# Patient Record
Sex: MALE | Race: ASIAN | NOT HISPANIC OR LATINO | URBAN - METROPOLITAN AREA
[De-identification: names, ages, dates, MRNs, and addresses within clinical notes are randomized per-mention and may not be internally consistent; named-entity substitution may affect disease eponyms.]

---

## 2020-03-09 ENCOUNTER — EMERGENCY (EMERGENCY)
Facility: HOSPITAL | Age: 45
LOS: 0 days | Discharge: HOME | End: 2020-03-09
Attending: EMERGENCY MEDICINE
Payer: MEDICAID

## 2020-03-09 VITALS
RESPIRATION RATE: 19 BRPM | HEIGHT: 70 IN | DIASTOLIC BLOOD PRESSURE: 88 MMHG | TEMPERATURE: 98 F | WEIGHT: 240.08 LBS | HEART RATE: 108 BPM | OXYGEN SATURATION: 98 % | SYSTOLIC BLOOD PRESSURE: 154 MMHG

## 2020-03-09 VITALS
RESPIRATION RATE: 18 BRPM | SYSTOLIC BLOOD PRESSURE: 145 MMHG | TEMPERATURE: 98 F | HEART RATE: 103 BPM | OXYGEN SATURATION: 98 % | DIASTOLIC BLOOD PRESSURE: 91 MMHG

## 2020-03-09 DIAGNOSIS — Z79.899 OTHER LONG TERM (CURRENT) DRUG THERAPY: ICD-10-CM

## 2020-03-09 DIAGNOSIS — Z79.84 LONG TERM (CURRENT) USE OF ORAL HYPOGLYCEMIC DRUGS: ICD-10-CM

## 2020-03-09 DIAGNOSIS — R05 COUGH: ICD-10-CM

## 2020-03-09 DIAGNOSIS — E11.9 TYPE 2 DIABETES MELLITUS WITHOUT COMPLICATIONS: ICD-10-CM

## 2020-03-09 DIAGNOSIS — J45.901 UNSPECIFIED ASTHMA WITH (ACUTE) EXACERBATION: ICD-10-CM

## 2020-03-09 DIAGNOSIS — I10 ESSENTIAL (PRIMARY) HYPERTENSION: ICD-10-CM

## 2020-03-09 PROCEDURE — 99283 EMERGENCY DEPT VISIT LOW MDM: CPT

## 2020-03-09 RX ORDER — ALBUTEROL 90 UG/1
2 AEROSOL, METERED ORAL
Qty: 1 | Refills: 0
Start: 2020-03-09 | End: 2020-04-07

## 2020-03-09 NOTE — ED PROVIDER NOTE - OBJECTIVE STATEMENT
44 year old male with PMH of asthma, DM II, HTN otherwise presenting to ED due to increased cough and use of albuterol pump for past 2 days. pt states otherwise felt increased wheezing that would improve with pump - this starting after URI symptoms starting a few days ago. No fever/chills no persistent SOB, no chest pain.

## 2020-03-09 NOTE — ED ADULT NURSE NOTE - HOW MANY DRINKS CONTAINING ALCOHOL DO YOU HAVE ON A TYPICAL DAY WHEN YOU ARE DRINKING?
VACCINE ADMINISTRATION RECORD  PARENT / GUARDIAN APPROVAL  Date: 2021  Vaccine administered to: Debora Nassar     : 2019    MRN: [id number]    A copy of the appropriate Centers for Disease Control and Prevention Vaccine Information statement 1 or 2

## 2020-03-09 NOTE — ED PROVIDER NOTE - PATIENT PORTAL LINK FT
You can access the FollowMyHealth Patient Portal offered by [facility] by registering at the following website: [email or web address]. By joining [employer]’s FollowMyHealth portal, you will also be able to view your health information using other applications (apps) compatible with our system.

## 2020-03-09 NOTE — ED ADULT TRIAGE NOTE - CHIEF COMPLAINT QUOTE
PHX of asthma  dry cough x 2 days pt denies chest pain,  fever, chills, recent travel, and sick contacts.

## 2020-03-09 NOTE — ED PROVIDER NOTE - CLINICAL SUMMARY MEDICAL DECISION MAKING FREE TEXT BOX
pt with asthma exacerbation - otherwise used pump 2 hours prior with clear lungs during exam, no sign of SOB, no CP - will dc with new pump as requested by Pt and prednisone.

## 2020-03-09 NOTE — ED ADULT NURSE NOTE - OBJECTIVE STATEMENT
Patient received with complaints of cough and fever for the past 2 days. Patient denies any recent travel or been around other sick individuals.

## 2020-08-02 ENCOUNTER — EMERGENCY (EMERGENCY)
Facility: HOSPITAL | Age: 45
LOS: 1 days | Discharge: HOME | End: 2020-08-02
Payer: MEDICAID

## 2020-08-02 VITALS
OXYGEN SATURATION: 98 % | DIASTOLIC BLOOD PRESSURE: 86 MMHG | TEMPERATURE: 98 F | HEIGHT: 70 IN | RESPIRATION RATE: 18 BRPM | SYSTOLIC BLOOD PRESSURE: 141 MMHG | HEART RATE: 80 BPM | WEIGHT: 229.94 LBS

## 2020-08-02 DIAGNOSIS — K08.89 OTHER SPECIFIED DISORDERS OF TEETH AND SUPPORTING STRUCTURES: ICD-10-CM

## 2020-08-02 DIAGNOSIS — E11.9 TYPE 2 DIABETES MELLITUS WITHOUT COMPLICATIONS: ICD-10-CM

## 2020-08-02 DIAGNOSIS — I10 ESSENTIAL (PRIMARY) HYPERTENSION: ICD-10-CM

## 2020-08-02 PROCEDURE — 99283 EMERGENCY DEPT VISIT LOW MDM: CPT

## 2020-08-02 RX ORDER — IBUPROFEN 200 MG
800 TABLET ORAL ONCE
Refills: 0 | Status: COMPLETED | OUTPATIENT
Start: 2020-08-02 | End: 2020-08-02

## 2020-08-02 RX ORDER — IBUPROFEN 200 MG
1 TABLET ORAL
Qty: 21 | Refills: 0
Start: 2020-08-02 | End: 2020-08-08

## 2020-08-02 RX ORDER — OXYCODONE AND ACETAMINOPHEN 5; 325 MG/1; MG/1
1 TABLET ORAL ONCE
Refills: 0 | Status: DISCONTINUED | OUTPATIENT
Start: 2020-08-02 | End: 2020-08-02

## 2020-08-02 RX ADMIN — Medication 300 MILLIGRAM(S): at 21:46

## 2020-08-02 RX ADMIN — Medication 800 MILLIGRAM(S): at 21:46

## 2020-08-02 RX ADMIN — OXYCODONE AND ACETAMINOPHEN 1 TABLET(S): 5; 325 TABLET ORAL at 21:45

## 2020-08-02 NOTE — ED PROVIDER NOTE - ENMT, MLM
Airway patent, Nasal mucosa clear. Mouth with normal mucosa. Throat has no vesicles, no oropharyngeal exudates and uvula is midline. no trismus. no stridor. + cracked tooth # 17 with surrounding gingival swelling. no abscess or fluctuance. no subinguinal swelling

## 2020-08-02 NOTE — ED PROVIDER NOTE - SKIN, MLM
Skin normal color for race, warm, dry and intact. + mild left sided facial swelling. no erythema or increased warmth. no visible abscess.

## 2020-08-02 NOTE — ED PROVIDER NOTE - NSFOLLOWUPCLINICS_GEN_ALL_ED_FT
[facility]  Dental  [address] [place], [place] [address]  Phone: [phone]  Fax:   Follow Up Time: 1-3 Days

## 2020-08-02 NOTE — ED PROVIDER NOTE - CLINICAL SUMMARY MEDICAL DECISION MAKING FREE TEXT BOX
44 yo male with h/o DM and  HTN presents to the ED c/o left side lower dental pain x 2-3 days. Patient admits to having issue with same tooth in past. Patient has appointment with his dentist tomorrow. Pain constant with radiation to left ear, worse with eating and hot/cold. Patient took motrin this morning and then 3 tabs of tylenol 1 hour ago with minimal relief. Patient also using oral gel - no improvement. Associated with left sided facial swelling, however improved from yesterday. Denies fever, chills, chest pain, sob, abd pain, N/V, neck pain/stiffness, headache, change in voice, excessive drooling. A/P; 44 yo male with h/o DM and  HTN presents to the ED c/o left side lower dental pain x 2-3 days. Patient admits to having issue with same tooth in past. Patient has appointment with his dentist tomorrow. Pain constant with radiation to left ear, worse with eating and hot/cold. Patient took motrin this morning and then 3 tabs of tylenol 1 hour ago with minimal relief. Patient also using oral gel - no improvement. Associated with left sided facial swelling, however improved from yesterday. Denies fever, chills, chest pain, sob, abd pain, N/V, neck pain/stiffness, headache, change in voice, excessive drooling. A/P: + cracked tooth with surround gingival swelling, no abscess, no sublingual swelling. Patient given motrin, percocet and clinda in ED. Patient has appointment with dentist tomorrow. Will dc with motrin and clinda. Patient understands return precautions.

## 2020-08-02 NOTE — ED PROVIDER NOTE - PATIENT PORTAL LINK FT
You can access the FollowMyHealth Patient Portal offered by [facility] by registering at the following website: [email or web address]. By joining [employer]’s FollowMyHealth portal, you will also be able to view your health information using other applications (apps) compatible with our system.

## 2020-08-02 NOTE — ED PROVIDER NOTE - OBJECTIVE STATEMENT
46 yo male with h/o DM and  HTN presents to the ED c/o left side lower dental pain x 2-3 days. Patient admits to having issue with same tooth in past. Patient has appointment with his dentist tomorrow. Pain constant with radiation to left ear, worse with eating and hot/cold. Patient took motrin this morning and then 3 tabs of tylenol 1 hour ago with minimal relief. Patient also using oral gel - no improvement. Associated with left sided facial swelling, however improved from yesterday. Denies fever, chills, chest pain, sob, abd pain, N/V, neck pain/stiffness, headache, change in voice, excessive drooling.

## 2020-08-03 PROBLEM — J45.909 UNSPECIFIED ASTHMA, UNCOMPLICATED: Chronic | Status: ACTIVE | Noted: 2020-03-09

## 2020-08-03 PROBLEM — E11.9 TYPE 2 DIABETES MELLITUS WITHOUT COMPLICATIONS: Chronic | Status: ACTIVE | Noted: 2020-03-09

## 2020-08-03 PROBLEM — I10 ESSENTIAL (PRIMARY) HYPERTENSION: Chronic | Status: ACTIVE | Noted: 2020-03-09

## 2021-12-14 ENCOUNTER — EMERGENCY (EMERGENCY)
Facility: HOSPITAL | Age: 46
LOS: 0 days | Discharge: HOME | End: 2021-12-14
Attending: EMERGENCY MEDICINE
Payer: MEDICAID

## 2021-12-14 VITALS
SYSTOLIC BLOOD PRESSURE: 162 MMHG | DIASTOLIC BLOOD PRESSURE: 88 MMHG | TEMPERATURE: 98 F | OXYGEN SATURATION: 97 % | RESPIRATION RATE: 18 BRPM | HEART RATE: 99 BPM

## 2021-12-14 VITALS
RESPIRATION RATE: 20 BRPM | WEIGHT: 240.08 LBS | TEMPERATURE: 98 F | HEIGHT: 70 IN | DIASTOLIC BLOOD PRESSURE: 90 MMHG | SYSTOLIC BLOOD PRESSURE: 154 MMHG | OXYGEN SATURATION: 96 % | HEART RATE: 99 BPM

## 2021-12-14 DIAGNOSIS — R06.2 WHEEZING: ICD-10-CM

## 2021-12-14 DIAGNOSIS — I10 ESSENTIAL (PRIMARY) HYPERTENSION: ICD-10-CM

## 2021-12-14 DIAGNOSIS — J45.901 UNSPECIFIED ASTHMA WITH (ACUTE) EXACERBATION: ICD-10-CM

## 2021-12-14 DIAGNOSIS — Z88.0 ALLERGY STATUS TO PENICILLIN: ICD-10-CM

## 2021-12-14 DIAGNOSIS — Z79.84 LONG TERM (CURRENT) USE OF ORAL HYPOGLYCEMIC DRUGS: ICD-10-CM

## 2021-12-14 DIAGNOSIS — E11.9 TYPE 2 DIABETES MELLITUS WITHOUT COMPLICATIONS: ICD-10-CM

## 2021-12-14 DIAGNOSIS — Z20.822 CONTACT WITH AND (SUSPECTED) EXPOSURE TO COVID-19: ICD-10-CM

## 2021-12-14 PROCEDURE — 99284 EMERGENCY DEPT VISIT MOD MDM: CPT

## 2021-12-14 RX ORDER — ALBUTEROL 90 UG/1
2 AEROSOL, METERED ORAL ONCE
Refills: 0 | Status: COMPLETED | OUTPATIENT
Start: 2021-12-14 | End: 2021-12-14

## 2021-12-14 RX ORDER — GLYBURIDE 5 MG
0 TABLET ORAL
Qty: 0 | Refills: 0 | DISCHARGE

## 2021-12-14 RX ORDER — ALBUTEROL 90 UG/1
2 AEROSOL, METERED ORAL
Qty: 1 | Refills: 0
Start: 2021-12-14 | End: 2022-01-12

## 2021-12-14 RX ORDER — IPRATROPIUM/ALBUTEROL SULFATE 18-103MCG
3 AEROSOL WITH ADAPTER (GRAM) INHALATION ONCE
Refills: 0 | Status: COMPLETED | OUTPATIENT
Start: 2021-12-14 | End: 2021-12-14

## 2021-12-14 RX ORDER — GEMFIBROZIL 600 MG
1 TABLET ORAL
Qty: 0 | Refills: 0 | DISCHARGE

## 2021-12-14 RX ADMIN — Medication 3 MILLILITER(S): at 05:04

## 2021-12-14 RX ADMIN — Medication 50 MILLIGRAM(S): at 04:40

## 2021-12-14 RX ADMIN — Medication 3 MILLILITER(S): at 05:29

## 2021-12-14 RX ADMIN — ALBUTEROL 2 PUFF(S): 90 AEROSOL, METERED ORAL at 05:31

## 2021-12-14 NOTE — ED PROVIDER NOTE - PATIENT PORTAL LINK FT
You can access the FollowMyHealth Patient Portal offered by [facility] by registering at the following website: [email or web address]. By joining [employer]’s FollowMyHealth portal, you will also be able to view your health information using other applications (apps) compatible with our system.

## 2021-12-14 NOTE — ED ADULT NURSE NOTE - CHPI ED NUR SYMPTOMS NEG
no body aches/no chest pain/no chills/no diaphoresis/no edema/no fever/no headache/no hemoptysis/no shortness of breath

## 2021-12-14 NOTE — ED ADULT TRIAGE NOTE - CHIEF COMPLAINT QUOTE
c/o difficulty breathing with some chest tightness, with some occasional non-productive cough:  took albuterol with some effect but ran out of meds. pt able to complete sentences in triage without sob noted. pt with hx of asthma, htn, dm

## 2021-12-14 NOTE — ED ADULT NURSE NOTE - OBJECTIVE STATEMENT
Pt is a 47 yo M, AOx4 ambulatory pmhx of asthma, htn, dm p/w difficulty breathing. Pt states he has some wheezing and occasional unproductive dry cough beginning toady at approx 1700. Pt states he uses HFA pump at home but has run out. Bilateral wheezing noted upon assessment. Pt speaking in full sentences, unlabored breathing. NKDA.

## 2021-12-14 NOTE — ED PROVIDER NOTE - OBJECTIVE STATEMENT
Pertinent PMH/PSH/FHx/SHx and Review of Systems contained within:  Patient presents to the ED for asthma exacerbation.  Patient well appearing reports wheezing since yesterday which he attributes to cold weather.  Patient says that he ran out of his ventolin pump at home.  Never intubated.  Denies fever, change in sputum, chest pain, sore throat, URI sx.  Patient denies EtOH/tobacco/illicit substance use.    ROS: No fever/chills, No headache/photophobia/eye pain/changes in vision, No ear pain/sore throat/dysphagia, No chest pain/palpitations, no stridor, No abdominal pain, No N/V/D/melena, no dysuria/frequency/discharge, No neck/back pain, no rash, no changes in neurological status/function.

## 2021-12-14 NOTE — ED PROVIDER NOTE - CLINICAL SUMMARY MEDICAL DECISION MAKING FREE TEXT BOX
Patient with mild asthma exacerbation, ran out of ventolin.  VSS.  Treated with neb and steroid in ER.  REfused covid swab, CXR, has no signs of infection.  Improved with nebs.  Discussed results and outcome of today's visit with the patient/family, copy of results given with discharge.  Patient advised to arrange calvillo follow up with their PMD and/or any provided referral(s) within the next few days and are cautioned to return to the Emergency Department for any worsening symptoms.  Patient advised that their doctor may call  to follow up on the specific results of the tests performed today in the emergency department.   Patient appears well on discharge.

## 2021-12-14 NOTE — ED PROVIDER NOTE - PHYSICAL EXAMINATION
Gen: Alert, NAD, well appearing  Head: NC, AT, EOMI, normal lids/conjunctiva  ENT: normal hearing, patent oropharynx without erythema/exudate, uvula midline  Neck: +supple, no tenderness/meningismus/JVD, +Trachea midline  Pulm: Bilateral BS, normal resp effort, no retractions, +BL wheezes  CV: RRR, no M/R/G, +dist pulses  Abd: soft, NT/ND, Negative [place] signs, +BS, no palpable masses  Mskel: no edema/erythema/cyanosis  Skin: no rash, warm/dry  Neuro: AAOx3, no apparent sensory/motor deficits, coordination intact

## 2022-01-06 ENCOUNTER — EMERGENCY (EMERGENCY)
Facility: HOSPITAL | Age: 47
LOS: 0 days | Discharge: HOME | End: 2022-01-06
Attending: EMERGENCY MEDICINE
Payer: MEDICAID

## 2022-01-06 VITALS
RESPIRATION RATE: 19 BRPM | SYSTOLIC BLOOD PRESSURE: 132 MMHG | TEMPERATURE: 98 F | HEIGHT: 70 IN | OXYGEN SATURATION: 98 % | DIASTOLIC BLOOD PRESSURE: 84 MMHG | HEART RATE: 115 BPM | WEIGHT: 240.08 LBS

## 2022-01-06 DIAGNOSIS — E11.9 TYPE 2 DIABETES MELLITUS WITHOUT COMPLICATIONS: ICD-10-CM

## 2022-01-06 DIAGNOSIS — I10 ESSENTIAL (PRIMARY) HYPERTENSION: ICD-10-CM

## 2022-01-06 DIAGNOSIS — Z88.0 ALLERGY STATUS TO PENICILLIN: ICD-10-CM

## 2022-01-06 DIAGNOSIS — Z79.84 LONG TERM (CURRENT) USE OF ORAL HYPOGLYCEMIC DRUGS: ICD-10-CM

## 2022-01-06 DIAGNOSIS — L02.215 CUTANEOUS ABSCESS OF PERINEUM: ICD-10-CM

## 2022-01-06 DIAGNOSIS — R10.2 PELVIC AND PERINEAL PAIN: ICD-10-CM

## 2022-01-06 LAB — GLUCOSE BLDC GLUCOMTR-MCNC: 223 MG/DL — HIGH (ref 70–99)

## 2022-01-06 PROCEDURE — 99284 EMERGENCY DEPT VISIT MOD MDM: CPT

## 2022-01-06 RX ORDER — RAMIPRIL 5 MG
0 CAPSULE ORAL
Qty: 0 | Refills: 0 | DISCHARGE

## 2022-01-06 RX ORDER — METFORMIN HYDROCHLORIDE 850 MG/1
1 TABLET ORAL
Qty: 0 | Refills: 0 | DISCHARGE

## 2022-01-06 RX ORDER — AZTREONAM 2 G
1 VIAL (EA) INJECTION
Qty: 20 | Refills: 0
Start: 2022-01-06 | End: 2022-01-15

## 2022-01-06 RX ORDER — ALBUTEROL 90 UG/1
0 AEROSOL, METERED ORAL
Qty: 0 | Refills: 0 | DISCHARGE

## 2022-01-06 RX ORDER — ACETAMINOPHEN 500 MG
650 TABLET ORAL ONCE
Refills: 0 | Status: COMPLETED | OUTPATIENT
Start: 2022-01-06 | End: 2022-01-06

## 2022-01-06 RX ADMIN — Medication 1 TABLET(S): at 18:41

## 2022-01-06 RX ADMIN — Medication 650 MILLIGRAM(S): at 18:41

## 2022-01-06 NOTE — ED ADULT NURSE NOTE - OBJECTIVE STATEMENT
47 YO M here for left testicle / scrotal swelling.  pain 5/10 worse when sitting.   he is diabetic, worried about this swelling.  Reddened.   A&OX3, laying on stretcher in NAD.  tried tylenol at home with no relief.  pt reports he had an abscess in similar spot ten years ago in same place and had it drained.

## 2022-01-06 NOTE — ED PROVIDER NOTE - CARE PROVIDER_API CALL
Roni Mendoza)  Surgery  General  [address], [address] [place] [address]  Phone: [phone]  Fax: [phone]  Follow Up Time:

## 2022-01-06 NOTE — ED PROVIDER NOTE - PATIENT PORTAL LINK FT
You can access the FollowMyHealth Patient Portal offered by [facility] by registering at the following website: [email or web address]. By joining [employer]’s FollowMyHealth portal, you will also be able to view your health information using other applications (apps) compatible with our system.

## 2022-01-06 NOTE — ED ADULT TRIAGE NOTE - CHIEF COMPLAINT QUOTE
pt alert and oriented x4 walked in c/o swelling abscess to the left testicle x3 days denies any fever hx DM

## 2022-01-06 NOTE — ED PROVIDER NOTE - NSFOLLOWUPINSTRUCTIONS_ED_ALL_ED_FT
You were seen and evaluated in the ED for a perineal abscess - see below care instructions    Continue all previously prescribed medications as directed.      Take bactrim 500mg twice daily for 10 days - this ia an antibiotic - take all pills.    Take tylenol 650mg every 8 hours as needed for pain.    Take percocet 1 tablet at bedtime as needed for severe pain - do not drive or operate a motor vehicle while taking.    Follow up with your primary care provider in 48-72 hours - bring copies of your results.      If you have issues obtaining follow up, please call: [phone] to obtain a doctor or specialist who takes your insurance in your area.  You may call [phone] to make an appointment with the internal medicine clinic.     Follow up with surgeon within 5 days - see above contact information.    Return to the ER for worsening or persistent symptoms, and/or ANY NEW OR CONCERNING SYMPTOMS.  SEEK IMMEDIATE MEDICAL CARE IF YOU HAVE ANY OF THE FOLLOWING SYMPTOMS: chills, fever, muscle aches, or red streaking from the area.     Abscess    An abscess is an infected area that contains a collection of pus and debris. It can occur in almost any part of the body and occurs when the tissue gets infection. Symptoms include a painful mass that is red, warm, tender that might break open and HAVE drainage. If your health care provider gave you antibiotics make sure to take the full course and do not stop even if feeling better.     Please fill the bathtub with warm water and soak the area 3-5 times daily

## 2022-01-06 NOTE — ED PROVIDER NOTE - ATTENDING CONTRIBUTION TO CARE
Superficial abscess under left scrotum, no crepitus, small area of fluctuance. Pt has no fever, no vomiting, no diarrhea. Betadine applied, lidocaine used and will perform I&D.

## 2022-01-06 NOTE — ED ADULT NURSE NOTE - NSIMPLEMENTINTERV_GEN_ALL_ED
Implemented All Universal Safety Interventions:  [place] to call system. Call bell, personal items and telephone within reach. Instruct patient to call for assistance. Room bathroom lighting operational. Non-slip footwear when patient is off stretcher. Physically safe environment: no spills, clutter or unnecessary equipment. Stretcher in lowest position, wheels locked, appropriate side rails in place.

## 2022-01-06 NOTE — ED PROVIDER NOTE - CLINICAL SUMMARY MEDICAL DECISION MAKING FREE TEXT BOX
45 yo m with PMH DM, HTN, Asthma and perineal abscess presents with perineal pain.  On exam, area of fluctuance to left perineum measuring approx 2x1cm, pink, not warm, consistent with abscess. Small area - will give oral abx and attempt superficial I&D - if unsuccessful, will give surg follow-up due to reports of recurrence.

## 2022-01-06 NOTE — ED PROVIDER NOTE - OBJECTIVE STATEMENT
45 yo m with PMH DM, HTN, Asthma and perineal abscess presents with perineal pain. Reports 10 years ago having a large perineal abscess requiring large I&D and ABX. Reports this swelling has returned since, but he was able to manage with oral ABX. Reports swelling and mild pain returned 3 days ago and he would like to evaluate the area. Denies fever, chills, CP, SOB, dysuria, new sexual partners.

## 2024-03-17 ENCOUNTER — EMERGENCY (EMERGENCY)
Facility: HOSPITAL | Age: 49
LOS: 0 days | Discharge: HOME | End: 2024-03-17
Attending: EMERGENCY MEDICINE
Payer: MEDICAID

## 2024-03-17 VITALS
OXYGEN SATURATION: 96 % | SYSTOLIC BLOOD PRESSURE: 148 MMHG | HEART RATE: 106 BPM | DIASTOLIC BLOOD PRESSURE: 83 MMHG | HEIGHT: 70 IN | RESPIRATION RATE: 22 BRPM | WEIGHT: 227.96 LBS | TEMPERATURE: 98 F

## 2024-03-17 VITALS
HEART RATE: 99 BPM | TEMPERATURE: 98 F | OXYGEN SATURATION: 100 % | RESPIRATION RATE: 18 BRPM | DIASTOLIC BLOOD PRESSURE: 78 MMHG | SYSTOLIC BLOOD PRESSURE: 140 MMHG

## 2024-03-17 DIAGNOSIS — R06.02 SHORTNESS OF BREATH: ICD-10-CM

## 2024-03-17 DIAGNOSIS — R06.2 WHEEZING: ICD-10-CM

## 2024-03-17 DIAGNOSIS — J45.901 UNSPECIFIED ASTHMA WITH (ACUTE) EXACERBATION: ICD-10-CM

## 2024-03-17 DIAGNOSIS — T50.996A UNDERDOSING OF OTHER DRUGS, MEDICAMENTS AND BIOLOGICAL SUBSTANCES, INITIAL ENCOUNTER: ICD-10-CM

## 2024-03-17 DIAGNOSIS — Z88.0 ALLERGY STATUS TO PENICILLIN: ICD-10-CM

## 2024-03-17 DIAGNOSIS — Z91.148 PATIENT'S OTHER NONCOMPLIANCE WITH MEDICATION REGIMEN FOR OTHER REASON: ICD-10-CM

## 2024-03-17 DIAGNOSIS — R05.9 COUGH, UNSPECIFIED: ICD-10-CM

## 2024-03-17 PROCEDURE — 99284 EMERGENCY DEPT VISIT MOD MDM: CPT | Mod: 25

## 2024-03-17 PROCEDURE — 93010 ELECTROCARDIOGRAM REPORT: CPT

## 2024-03-17 RX ORDER — ALBUTEROL 90 UG/1
3 AEROSOL, METERED ORAL
Qty: 6 | Refills: 0
Start: 2024-03-17 | End: 2024-03-21

## 2024-03-17 RX ORDER — ALBUTEROL 90 UG/1
2 AEROSOL, METERED ORAL
Qty: 1 | Refills: 1
Start: 2024-03-17 | End: 2024-04-05

## 2024-03-17 RX ORDER — IPRATROPIUM/ALBUTEROL SULFATE 18-103MCG
3 AEROSOL WITH ADAPTER (GRAM) INHALATION ONCE
Refills: 0 | Status: COMPLETED | OUTPATIENT
Start: 2024-03-17 | End: 2024-03-17

## 2024-03-17 RX ADMIN — Medication 40 MILLIGRAM(S): at 03:38

## 2024-03-17 RX ADMIN — Medication 3 MILLILITER(S): at 03:38

## 2024-03-17 NOTE — ED PROVIDER NOTE - ED STEMI HIDDEN
05/12/21 1827   Handoff   Communication Given Transfer Handoff   Oncoming Nurse/Offgoing Nurse yvon/   Handoff Communication Face to Face; At bedside   Time Handoff Given [address]   Patient returned to room 1001 from PACU. Report received at bedside. Vital signs stable. Family at bedside. hide

## 2024-03-17 NOTE — ED ADULT NURSE NOTE - OBJECTIVE STATEMENT
pt is AOx3x, ambulatory with steady gait, pt presents to the ED with complaints of asthma exacerbation/cough. pt states he went to  2 days ago for dry cough, pt states he was out of his previous inhaler and was provided with a new one at  as well as prednisone. pt reports no relief after taking prescribed medications. diminished breath sounds noted on the R side, no wheezing noted. pt denies any CP, SOB, fever, chills, N/V/D, pain at this time. pt has pmh of asthma. EKG completed/pt placed on cardiac monitor.

## 2024-03-17 NOTE — ED ADULT NURSE NOTE - CINV DISCH MEDS REVIEWED YN
Patient in office with NP - needs to be seen for movement disorder. Referral in, however it will not allow me to schedule. Please advise if we can get this pt in with someone.   Yes

## 2024-03-17 NOTE — ED PROVIDER NOTE - OBJECTIVE STATEMENT
48-year-old male with history of asthma otherwise presents to ER due to persistent shortness of breath despite use of asthma inhaler at home.  Patient states he had ran out of as the medication and came into ED for further care.  States coughing for about 2 to 3 days with no fevers or chills noted.  Here in ED patient states that he is not feeling very short of breath but would just like 1 neb treatment and some refill for albuterol.

## 2024-03-17 NOTE — ED PROVIDER NOTE - CLINICAL SUMMARY MEDICAL DECISION MAKING FREE TEXT BOX
Patient was asthma exacerbation however not in any distress in ED.  Came in for albuterol refill and otherwise will start on prednisone as well given recent escalation of asthma.  Patient evaluated in ED without any respiratory distress and otherwise will DC with PMD follow-up if not improved

## 2024-03-17 NOTE — ED PROVIDER NOTE - PATIENT PORTAL LINK FT
You can access the FollowMyHealth Patient Portal offered by [facility] by registering at the following website: [email or web address]. By joining [employer]’s FollowMyHealth portal, you will also be able to view your health information using other applications (apps) compatible with our system.

## 2024-03-17 NOTE — ED PROVIDER NOTE - NSFOLLOWUPINSTRUCTIONS_ED_ALL_ED_FT
Asthma Attack  Upper body outline showing parts of the respiratory system, highlighting the bronchi.  Asthma attack, also called asthma flare or acute bronchospasm, is the sudden narrowing and tightening of the lower airways (bronchi) in the lungs, that can make it hard to breathe. The narrowing is caused by inflammation and tightening of the smooth muscle that wraps around the lower airways in the lungs.    Asthma attacks may cause coughing, high-pitched whistling sounds when you breathe, most often when you breathe out (wheezing), trouble breathing (shortness of breath), and chest pain. The airways may produce extra mucus caused by the inflammation and irritation. During an asthma attack, it can be difficult to breathe. It is important to get treatment right away. Asthma attacks can range from minor to life-threatening.    What are the causes?  Possible causes or triggers of this condition include:  Household allergens like dust, pet dander, and cockroaches.  Mold and pollen from trees or grass.  Air pollutants such as household , aerosol sprays, strong odors, and smoke of any kind.  Weather changes and cold air.  Stress or strong emotions such as crying or laughing hard.  Exercise or activity that requires a lot of energy.  Certain medicines or medical conditions such as:  Aspirin or beta-blockers.  Infections or inflammatory conditions, such as a flu (influenza), a cold, pneumonia, or inflammation of the nasal membranes (rhinitis).  Gastroesophageal reflux disease (GERD). GERD is a condition in which stomach acid backs up into your esophagus and spills into your trachea (windpipe), which can irritate your airways.  What are the signs or symptoms?  Symptoms of this condition include:  Wheezing.  Excessive coughing. This may only happen at night.  Chest tightness or pain.  Shortness of breath.  Difficulty talking in complete sentences.  Feeling like you cannot get enough air, no matter how hard you breathe (air hunger).  How is this diagnosed?  This condition may be diagnosed based on:  A physical exam and your medical history.  Your symptoms.  Tests to check for other causes of your symptoms or other conditions that may have triggered your asthma attack. These tests may include:  A chest X-ray.  Blood tests.  Lung function studies (spirometry) to evaluate the flow of air in your lungs.  How is this treated?  Treatment for this condition depends on the severity and cause of your asthma attack.  For mild attacks, you may receive medicines through a hand-held inhaler (metered dose inhaler or MDI) or through a device that turns liquid medicine into a mist (nebulizer). These medicines include:  Quick relief or rescue medicines that quickly relax the airways and lungs.  Long-acting medicines that are used daily to prevent (control) your asthma symptoms.  For moderate or severe attacks, you may be treated with steroid medicines by mouth or through an IV injection at the hospital.  For severe attacks, you may need oxygen therapy or a breathing machine (ventilator).  If your asthma attack was caused by an infection from bacteria, you will be given antibiotic medicines.  Follow these instructions at home:  Medicines    Take over-the-counter and prescription medicines only as told by your health care provider.  If you were prescribed an antibiotic medicine, take it as told by your health care provider. Do not stop using the antibiotic even if you start to feel better.  Tell your doctor if you are pregnant or may be pregnant to make sure your asthma medicine is safe to use during pregnancy.  Avoiding triggers    A sign showing that a person should not smoke.  Keep track of things that trigger your asthma attacks. Avoid exposure to these triggers.  Do not use any products that contain nicotine or tobacco. These products include cigarettes, chewing tobacco, and vaping devices, such as e-cigarettes. If you need help quitting, ask your health care provider.  When there is a lot of pollen, air pollution, or humidity, keep windows closed and use an air conditioner or go to places with air conditioning.  Asthma action plan    Work with your health care provider to make a written plan for managing and treating your asthma attacks (asthma action plan). This plan should include:  A list of your asthma triggers and how to avoid them.  A list of symptoms that you may have during an asthma attack.  Information about which medicine to take, when to take the medicine, and how much of the medicine to take.  Information to help you understand your peak flow measurements.  Daily actions that you can take to control your asthma symptoms.  Contact information for your health care providers.  If you have an asthma attack, act quickly. Follow the emergency steps on your written asthma action plan. This may prevent you from needing to go to the hospital.  Talk to a family member or close friend about your asthma action plan and who to contact in case you need help.  General instructions    Avoid excessive exercise or activity until your asthma attack goes away.  Stay up to date on all your vaccines, such as flu and pneumonia vaccines.  Keep all follow-up visits. This is important.  Contact a health care provider if:  You have followed your action plan for 1 hour and your peak flow reading is still at 50–79% of your personal best. This is in the yellow zone, which means "caution."  You need to use your quick reliever medicine more frequently than normal.  Your medicines are causing side effects, such as rash, itching, swelling, or trouble breathing.  Your symptoms do not improve after taking medicine.  You have a fever.  Get help right away if:  Your peak flow reading is less than 50% of your personal best. This is in the red zone, which means "danger."  You develop chest pain or discomfort.  Your medicines no longer seem to be helping.  You are coughing up bloody mucus.  You have a fever and your symptoms suddenly get worse.  You have trouble swallowing.  You feel very tired, and breathing becomes tiring.  These symptoms may be an emergency. Get help right away. Call 911.  Do not wait to see if the symptoms will go away.  Do not drive yourself to the hospital.  Summary  Asthma attacks are caused by narrowing or tightness in air passages, which causes shortness of breath, coughing, and wheezing.  Many things can trigger an asthma attack, such as allergens, weather changes, exercise, strong odors, and smoke of any kind.  If you have an asthma attack, act quickly. Follow the emergency steps on your written asthma action plan.  Get help right away if you have severe trouble breathing, chest pain, or fever, or if your home medicines are no longer helping with your symptoms.  This information is not intended to replace advice given to you by your health care provider. Make sure you discuss any questions you have with your health care provider.

## 2024-03-17 NOTE — ED ADULT TRIAGE NOTE - CHIEF COMPLAINT QUOTE
complaining of sob, cough started 2 days ago went to  on prednisone, inhalers doesn't help as per pt. h/o Asthma complaining of sob, cough started 2 days ago went to  on prednisone for 5 days , inhalers doesn't help as per pt. diminished breath sounds noted. h/o Asthma

## 2024-03-17 NOTE — ED ADULT NURSE NOTE - CHIEF COMPLAINT QUOTE
complaining of sob, cough started 2 days ago went to  on prednisone for 5 days , inhalers doesn't help as per pt. diminished breath sounds noted. h/o Asthma

## 2024-03-17 NOTE — ED ADULT NURSE NOTE - NSFALLUNIVINTERV_ED_ALL_ED
Bed/Stretcher in lowest position, wheels locked, appropriate side rails in place/Call bell, personal items and telephone in reach/Instruct patient to call for assistance before getting out of bed/chair/stretcher/Non-slip footwear applied when patient is off stretcher/[place] to call system/Physically safe environment - no spills, clutter or unnecessary equipment/Purposeful proactive rounding/Room/bathroom lighting operational, light cord in reach

## 2025-03-27 NOTE — ED PROVIDER NOTE - NS ED ATTENDING STATEMENT MOD
The patient's goals for the shift include      The clinical goals for the shift include Comfort and safety      Problem: Pain - Adult  Goal: Verbalizes/displays adequate comfort level or baseline comfort level  Outcome: Progressing     Problem: Safety - Adult  Goal: Free from fall injury  Outcome: Progressing     Problem: Discharge Planning  Goal: Discharge to home or other facility with appropriate resources  Outcome: Progressing     Problem: Fall/Injury  Goal: Not fall by end of shift  Outcome: Progressing     Problem: Skin  Goal: Promote/optimize nutrition  Outcome: Progressing  Flowsheets (Taken 3/26/2025 0532)  Promote/optimize nutrition: Consume > 50% meals/supplements        Attending Only
